# Patient Record
Sex: FEMALE | Race: WHITE | NOT HISPANIC OR LATINO | Employment: FULL TIME | URBAN - METROPOLITAN AREA
[De-identification: names, ages, dates, MRNs, and addresses within clinical notes are randomized per-mention and may not be internally consistent; named-entity substitution may affect disease eponyms.]

---

## 2017-05-04 ENCOUNTER — LAB REQUISITION (OUTPATIENT)
Dept: LAB | Facility: HOSPITAL | Age: 52
End: 2017-05-04

## 2017-05-04 DIAGNOSIS — L98.9 DISORDER OF SKIN OR SUBCUTANEOUS TISSUE: ICD-10-CM

## 2017-05-04 DIAGNOSIS — D48.5 NEOPLASM OF UNCERTAIN BEHAVIOR OF SKIN: ICD-10-CM

## 2017-05-04 PROCEDURE — 88305 TISSUE EXAM BY PATHOLOGIST: CPT | Performed by: PLASTIC SURGERY

## 2017-05-05 LAB
CYTO UR: NORMAL
LAB AP CASE REPORT: NORMAL
Lab: NORMAL
PATH REPORT.FINAL DX SPEC: NORMAL
PATH REPORT.GROSS SPEC: NORMAL

## 2021-06-24 RX ORDER — TEMAZEPAM 30 MG/1
CAPSULE ORAL
COMMUNITY
Start: 2013-02-19

## 2021-06-24 RX ORDER — MEDROXYPROGESTERONE ACETATE 2.5 MG/1
TABLET ORAL
COMMUNITY
Start: 2013-02-19

## 2021-06-24 RX ORDER — LORATADINE 10 MG/1
TABLET ORAL
COMMUNITY
Start: 2013-02-19

## 2021-07-06 ENCOUNTER — OFFICE VISIT (OUTPATIENT)
Dept: NEUROLOGY | Facility: CLINIC | Age: 56
End: 2021-07-06

## 2021-07-06 VITALS
HEART RATE: 78 BPM | DIASTOLIC BLOOD PRESSURE: 67 MMHG | WEIGHT: 135 LBS | TEMPERATURE: 97.8 F | HEIGHT: 64 IN | SYSTOLIC BLOOD PRESSURE: 103 MMHG | BODY MASS INDEX: 23.05 KG/M2

## 2021-07-06 DIAGNOSIS — G43.109 MIGRAINE WITH AURA AND WITHOUT STATUS MIGRAINOSUS, NOT INTRACTABLE: Primary | ICD-10-CM

## 2021-07-06 PROCEDURE — 99204 OFFICE O/P NEW MOD 45 MIN: CPT | Performed by: NURSE PRACTITIONER

## 2021-07-06 RX ORDER — MECLIZINE HYDROCHLORIDE 25 MG/1
TABLET ORAL
COMMUNITY

## 2021-07-06 RX ORDER — CHLORCYCLIZINE HYDROCHLORIDE AND PSEUDOEPHEDRINE HYDROCHLORIDE 25; 60 MG/1; MG/1
TABLET ORAL
COMMUNITY
Start: 2021-05-06

## 2021-07-06 RX ORDER — TOPIRAMATE 25 MG/1
TABLET ORAL
Qty: 30 TABLET | Refills: 2 | Status: SHIPPED | OUTPATIENT
Start: 2021-07-06 | End: 2021-10-13 | Stop reason: DRUGHIGH

## 2021-07-06 RX ORDER — ONDANSETRON HYDROCHLORIDE 8 MG/1
TABLET, FILM COATED ORAL
COMMUNITY
End: 2021-07-06 | Stop reason: SDUPTHER

## 2021-07-06 RX ORDER — CLONAZEPAM 0.5 MG/1
TABLET ORAL
COMMUNITY
Start: 2021-04-01

## 2021-07-06 RX ORDER — SUMATRIPTAN 50 MG/1
50 TABLET, FILM COATED ORAL DAILY
COMMUNITY
Start: 2021-04-29 | End: 2021-10-13

## 2021-07-06 RX ORDER — ONDANSETRON HYDROCHLORIDE 8 MG/1
8 TABLET, FILM COATED ORAL EVERY 12 HOURS PRN
Qty: 20 TABLET | Refills: 5 | Status: SHIPPED | OUTPATIENT
Start: 2021-07-06

## 2021-07-06 RX ORDER — TOPIRAMATE 25 MG/1
TABLET ORAL
Qty: 30 TABLET | Refills: 2 | Status: SHIPPED | OUTPATIENT
Start: 2021-07-06 | End: 2021-07-06 | Stop reason: SDUPTHER

## 2021-07-06 RX ORDER — ERGOCALCIFEROL 1.25 MG/1
50000 CAPSULE ORAL WEEKLY
COMMUNITY
Start: 2021-04-26

## 2021-07-06 NOTE — PROGRESS NOTES
Subjective:     Patient ID: Mary Ann Rebolledo is a 55 y.o. female.    CHIEF COMPLAINT: Change in chronic migraine    History of Present Illness Ms. Rebolledo is a very pleasant 55-year-old  female who was referred for chronic migraine with aura.  The patient reports over the past few months she has had a change in her migraines which constitutes auras which are severe consisting of losing part of her vision as well as flashing scotoma.  She reports she has had migraines since a teenager but this is a new impact from her migraines.  She has not had an MRI of her brain.  She had a trial on Trokendi but was unable to take it due to profound sleepiness and cognitive issues.  She has not had a trial on immediate release low-dose Topamax.  She reports she could not tolerate Trokendi on a daily basis.  She reports Imitrex 50 mg does stop the migraine pain, orders will continue.     The following portions of the patient's history were reviewed and updated as appropriate: allergies, current medications, past family history, past medical history, past social history, past surgical history and problem list.  With change in Migriane multiple Auras.  Multiple visual changes.      Complaint:Migraine  Onset: since teenager, worst since 5/2021  Aura: loses part of her vision and flashing scotoma - several a week   Location: Occiptal area back side of head on left side   Quality: intense pain  Severity: 7.5-8  Duration: 24 hrs   Frequency: 1 every 2-3 months   Timing:  random  Context: unknown  Modifying factors:   Associated Signs and symptoms:  intense headache, Photo sensitivity.  Current meds: none.. patient has tried trokendi in the past but it makes her groggy the next day      Past medical history significant for: Depression, migraine, hyperlipidemia, B12 and vitamin D deficiencies.          History reviewed. No pertinent family history.    Past Medical History:   Diagnosis Date   • Depressive disorder    • Headache    •  Hyperlipidemia    • Migraine    • Vitamin B12 deficiency    • Vitamin D deficiency        Social History     Socioeconomic History   • Marital status:      Spouse name: Not on file   • Number of children: Not on file   • Years of education: Not on file   • Highest education level: Not on file   Tobacco Use   • Smoking status: Current Every Day Smoker     Packs/day: 1.00     Types: Cigarettes         Current Outpatient Medications:   •  loratadine (Claritin) 10 MG tablet, CLARITIN 10 MG TABS, Disp: , Rfl:   •  medroxyPROGESTERone (PROVERA) 2.5 MG tablet, MEDROXYPROGESTERONE ACETATE 2.5 MG TABS, Disp: , Rfl:   •  ondansetron (ZOFRAN) 8 MG tablet, Take 1 tablet by mouth Every 12 (Twelve) Hours As Needed for Nausea or Vomiting., Disp: 20 tablet, Rfl: 5  •  temazepam (RESTORIL) 30 MG capsule, TEMAZEPAM 30 MG CAPS, Disp: , Rfl:   •  topiramate (Topamax) 25 MG tablet, 1 at bed time for 1 week then 1 twice a day for 1 week then 2 tabs twice a day, Disp: 30 tablet, Rfl: 2  •  clonazePAM (KlonoPIN) 0.5 MG tablet, TAKE 1/2 TABLET BY MOUTH EVERY DAY AS NEEDED, Disp: , Rfl:   •  diclofenac (VOLTAREN) 50 MG EC tablet, diclofenac sodium 50 mg tablet,delayed release  TK 1 T PO BID, Disp: , Rfl:   •  Diclofenac Sodium (VOLTAREN) 1 % gel gel, diclofenac 1 % topical gel  APPLY 2 GRAMS TO THE AFFECTED AREA(S) BY TOPICAL ROUTE 4 TIMES PER DAY, Disp: , Rfl:   •  meclizine (ANTIVERT) 25 MG tablet, meclizine 25 mg tablet  Take 1 tablet every 4-6 hours by oral route., Disp: , Rfl:   •  Stahist AD 25-60 MG tablet, TAKE 1 TABLET BY MOUTH EVERY 8 HOURS FOR 5 DAYS, Disp: , Rfl:   •  SUMAtriptan (IMITREX) 50 MG tablet, Take 50 mg by mouth Daily., Disp: , Rfl:   •  vitamin D (ERGOCALCIFEROL) 1.25 MG (24351 UT) capsule capsule, Take 50,000 Units by mouth 1 (One) Time Per Week., Disp: , Rfl:     Review of Systems   Constitutional: Negative for chills and fatigue.   HENT: Positive for sinus pressure. Negative for sinus pain.    Eyes:  Negative for photophobia and visual disturbance.   Respiratory: Negative for apnea.    Cardiovascular: Negative for palpitations.   Gastrointestinal: Negative for constipation.   Endocrine: Negative for cold intolerance and heat intolerance.   Genitourinary: Negative for frequency and urgency.   Musculoskeletal: Negative for neck pain and neck stiffness.   Neurological: Positive for dizziness and headaches.   Psychiatric/Behavioral: Negative for confusion and hallucinations.        I have reviewed ROS completed by medical assistant.     Objective:    Neurologic Exam     Mental Status   Oriented to person, place, and time.   Speech: speech is normal     Cranial Nerves     CN III, IV, VI   Pupils are equal, round, and reactive to light.    Gait, Coordination, and Reflexes     Gait  Gait: normal    Reflexes   Right brachioradialis: 2+  Left brachioradialis: 2+  Right biceps: 2+  Left biceps: 2+  Right triceps: 2+  Left triceps: 2+  Right patellar: 2+  Left patellar: 2+  Right achilles: 2+  Left achilles: 2+      Physical Exam  Vitals and nursing note reviewed.   Constitutional:       Appearance: Normal appearance. She is well-developed and well-groomed.   HENT:      Head: Normocephalic.      Right Ear: Tympanic membrane normal.      Nose: Nose normal.      Mouth/Throat:      Mouth: Mucous membranes are moist.   Eyes:      Pupils: Pupils are equal, round, and reactive to light.   Cardiovascular:      Rate and Rhythm: Normal rate and regular rhythm.      Pulses: Normal pulses.      Heart sounds: Normal heart sounds.   Pulmonary:      Effort: Pulmonary effort is normal.      Breath sounds: Normal breath sounds.   Musculoskeletal:         General: Normal range of motion.      Cervical back: Full passive range of motion without pain and normal range of motion.   Skin:     General: Skin is warm.   Neurological:      General: No focal deficit present.      Mental Status: She is alert and oriented to person, place, and time.       Cranial Nerves: Cranial nerves are intact.      Sensory: Sensation is intact.      Motor: Motor function is intact.      Coordination: Coordination is intact.      Gait: Gait is intact.      Deep Tendon Reflexes: Babinski sign absent on the right side. Babinski sign absent on the left side.      Reflex Scores:       Tricep reflexes are 2+ on the right side and 2+ on the left side.       Bicep reflexes are 2+ on the right side and 2+ on the left side.       Brachioradialis reflexes are 2+ on the right side and 2+ on the left side.       Patellar reflexes are 2+ on the right side and 2+ on the left side.       Achilles reflexes are 2+ on the right side and 2+ on the left side.  Psychiatric:         Attention and Perception: Attention normal.         Mood and Affect: Mood normal.         Speech: Speech normal.         Behavior: Behavior normal. Behavior is cooperative.         Thought Content: Thought content normal.         Cognition and Memory: Cognition normal.         Judgment: Judgment normal.         Assessment/Plan:    Diagnoses and all orders for this visit:    1. Migraine with aura and without status migrainosus, not intractable (Primary)  -     Discontinue: topiramate (Topamax) 25 MG tablet; I at bedtime for 1 week then 1 tab twice a day, then 2 po bid  Dispense: 30 tablet; Refill: 2  -     topiramate (Topamax) 25 MG tablet; 1 at bed time for 1 week then 1 twice a day for 1 week then 2 tabs twice a day  Dispense: 30 tablet; Refill: 2  -     ondansetron (ZOFRAN) 8 MG tablet; Take 1 tablet by mouth Every 12 (Twelve) Hours As Needed for Nausea or Vomiting.  Dispense: 20 tablet; Refill: 5  -     MRI Brain Without Contrast; Future        Return in about 3 months (around 10/6/2021) for Next scheduled follow up Latha Grover .    I spent 50 minutes caring for Mary Ann on this date of service. This time includes time spent by me in the following activities: obtaining and/or reviewing a separately obtained  history, performing a medically appropriate examination and/or evaluation, counseling and educating the patient/family/caregiver, ordering medications, tests, or procedures, referring and communicating with other health care professionals and documenting information in the medical record.      This document has been electronically signed by Latha DOUGHERTY on July 6, 2021 11:04 EDT

## 2021-07-28 ENCOUNTER — HOSPITAL ENCOUNTER (OUTPATIENT)
Dept: MRI IMAGING | Facility: HOSPITAL | Age: 56
Discharge: HOME | End: 2021-07-28
Admitting: NURSE PRACTITIONER

## 2021-07-28 DIAGNOSIS — G43.109 MIGRAINE WITH AURA AND WITHOUT STATUS MIGRAINOSUS, NOT INTRACTABLE: ICD-10-CM

## 2021-07-28 PROCEDURE — 70551 MRI BRAIN STEM W/O DYE: CPT

## 2021-08-02 ENCOUNTER — TELEPHONE (OUTPATIENT)
Dept: NEUROLOGY | Facility: CLINIC | Age: 56
End: 2021-08-02

## 2021-08-02 NOTE — TELEPHONE ENCOUNTER
----- Message from HAO Lyle sent at 8/2/2021  7:45 AM EDT -----  Please phone the patient and notify them that the results are normal.

## 2021-10-08 NOTE — PROGRESS NOTES
Subjective: migraine    Patient ID: Mary Ann Rebolledo is a 56 y.o. female.    CHIEF COMPLAINT:migraine    History of Present Illness Ms. Rebolledo is a very pleasant 56-year-old  female who is following up on chronic migraine with aura.  The patient reports over the past few months she has had a change in her migraines which constitutes auras which are severe consisting of losing part of her vision as well as flashing scotoma.  She reports she has had migraines since a teenager but this is a new impact from her migraines.  She was scheduled for an MRI of her brain.  She has had a trial on Trokendi but was unable to take it due to profound sleepiness and cognitive issues.  She states that the pharmacy was having difficulty and she did not receive the Topamax for preventative.  She feels her migraines are the same as they were at the last visit.  She reports she did have 1 week when they were severe including 1 waking migraine.She states that sumatriptan does stop the migraines.  She would like to restart low-dose Topamax.        MRI of the brain was discussed with the patient  DATE OF EXAM:   7/28/2021 4:50 PM   PROCEDURE:   MRI BRAIN WO CONTRAST-  IMPRESSION:  1 MRI the brain is normal.   Electronically Signed By-Woody Leon MD On:7/28/2021 7:21 PM  This report was finalized on [id number] by  Woody Leon MD.    The following portions of the patient's history were reviewed and updated as appropriate: allergies, current medications, past family history, past medical history, past social history, past surgical history and problem list.      History reviewed. No pertinent family history.    Past Medical History:   Diagnosis Date   • Depressive disorder    • Headache    • Hyperlipidemia    • Migraine    • Vitamin B12 deficiency    • Vitamin D deficiency        Social History     Socioeconomic History   • Marital status:    Tobacco Use   • Smoking status: Current Every Day Smoker      Packs/day: 1.00     Types: Cigarettes   • Smokeless tobacco: Never Used   Vaping Use   • Vaping Use: Never used   Substance and Sexual Activity   • Alcohol use: Defer   • Drug use: Defer   • Sexual activity: Defer         Current Outpatient Medications:   •  amoxicillin-clavulanate (AUGMENTIN) 875-125 MG per tablet, Take 1 tablet by mouth Every 12 (Twelve) Hours., Disp: , Rfl:   •  clonazePAM (KlonoPIN) 0.5 MG tablet, TAKE 1/2 TABLET BY MOUTH EVERY DAY AS NEEDED, Disp: , Rfl:   •  colestipol (COLESTID) 1 g tablet, Take 1 g by mouth 3 (Three) Times a Day., Disp: , Rfl:   •  diclofenac (VOLTAREN) 50 MG EC tablet, diclofenac sodium 50 mg tablet,delayed release  TK 1 T PO BID, Disp: , Rfl:   •  Diclofenac Sodium (VOLTAREN) 1 % gel gel, diclofenac 1 % topical gel  APPLY 2 GRAMS TO THE AFFECTED AREA(S) BY TOPICAL ROUTE 4 TIMES PER DAY, Disp: , Rfl:   •  fluticasone (FLONASE) 50 MCG/ACT nasal spray, , Disp: , Rfl:   •  loratadine (Claritin) 10 MG tablet, CLARITIN 10 MG TABS, Disp: , Rfl:   •  meclizine (ANTIVERT) 25 MG tablet, meclizine 25 mg tablet  Take 1 tablet every 4-6 hours by oral route., Disp: , Rfl:   •  medroxyPROGESTERone (PROVERA) 2.5 MG tablet, MEDROXYPROGESTERONE ACETATE 2.5 MG TABS, Disp: , Rfl:   •  omeprazole (priLOSEC) 20 MG capsule, Take 20 mg by mouth 2 (Two) Times a Day., Disp: , Rfl:   •  ondansetron (ZOFRAN) 8 MG tablet, Take 1 tablet by mouth Every 12 (Twelve) Hours As Needed for Nausea or Vomiting., Disp: 20 tablet, Rfl: 5  •  Stahist AD 25-60 MG tablet, TAKE 1 TABLET BY MOUTH EVERY 8 HOURS FOR 5 DAYS, Disp: , Rfl:   •  temazepam (RESTORIL) 30 MG capsule, TEMAZEPAM 30 MG CAPS, Disp: , Rfl:   •  vitamin D (ERGOCALCIFEROL) 1.25 MG (03646 UT) capsule capsule, Take 50,000 Units by mouth 1 (One) Time Per Week., Disp: , Rfl:   •  SUMAtriptan (Imitrex) 50 MG tablet, 1  tab at onset of Migraine. May rpt> 1 time in 2 hours. Max 2/24 or 3 days per week, Disp: 18 tablet, Rfl: 11  •  topiramate (Topamax) 25 MG  tablet, Take one at bed time for 1 month then 1 tab twice aday, Disp: 60 tablet, Rfl: 5    Review of Systems   Constitutional: Negative for fatigue and fever.   HENT: Negative for ear discharge and ear pain.    Eyes: Positive for pain. Negative for itching.   Respiratory: Negative for cough and shortness of breath.    Cardiovascular: Negative for chest pain.   Gastrointestinal: Negative for abdominal pain and nausea.   Endocrine: Negative for cold intolerance and heat intolerance.   Genitourinary: Negative for urgency.   Musculoskeletal: Negative for back pain and neck pain.   Neurological: Negative for dizziness and light-headedness.   Psychiatric/Behavioral: Negative for agitation and confusion.        I have reviewed ROS completed by medical assistant.     Objective:    Neurologic Exam     Mental Status   Oriented to person, place, and time.     Cranial Nerves     CN III, IV, VI   Pupils are equal, round, and reactive to light.      Physical Exam  Vitals and nursing note reviewed.   Constitutional:       Appearance: Normal appearance.   HENT:      Head: Normocephalic.      Nose: Nose normal.      Mouth/Throat:      Mouth: Mucous membranes are moist.   Eyes:      Extraocular Movements: Extraocular movements intact.      Pupils: Pupils are equal, round, and reactive to light.   Cardiovascular:      Rate and Rhythm: Normal rate and regular rhythm.      Pulses: Normal pulses.   Pulmonary:      Effort: Pulmonary effort is normal.   Musculoskeletal:         General: Normal range of motion.      Cervical back: Normal range of motion.   Neurological:      General: No focal deficit present.      Mental Status: She is alert and oriented to person, place, and time.      Cranial Nerves: No cranial nerve deficit.      Motor: No weakness.      Coordination: Coordination normal.      Gait: Gait normal.   Psychiatric:         Mood and Affect: Mood normal.         Thought Content: Thought content normal.         Judgment:  Judgment normal.       Discussion: The patient was told to contact the clinic if she had any difficulty getting her medications.  She was also told to call if she had any problems tolerating the Topamax.  She is to keep a migraine calendar.  And will be seen back in a follow-up in 3 months for reevaluation.    Assessment/Plan:    Diagnoses and all orders for this visit:    1. Migraine with aura and without status migrainosus, not intractable (Primary)  -     topiramate (Topamax) 25 MG tablet; Take one at bed time for 1 month then 1 tab twice aday  Dispense: 60 tablet; Refill: 5  -     SUMAtriptan (Imitrex) 50 MG tablet; 1  tab at onset of Migraine. May rpt> 1 time in 2 hours. Max 2/24 or 3 days per week  Dispense: 18 tablet; Refill: 11        Return in about 3 months (around 1/13/2022) for Latha Grover.    I spent25  minutes caring for Mary Ann on this date of service. This time includes time spent by me in the following activities: reviewing tests, obtaining and/or reviewing a separately obtained history, performing a medically appropriate examination and/or evaluation, counseling and educating the patient/family/caregiver, ordering medications, tests, or procedures, referring and communicating with other health care professionals and documenting information in the medical record.      This document has been electronically signed by Latha DOUGHERTY on October 13, 2021 17:02 EDT

## 2021-10-13 ENCOUNTER — OFFICE VISIT (OUTPATIENT)
Dept: NEUROLOGY | Facility: CLINIC | Age: 56
End: 2021-10-13

## 2021-10-13 VITALS
WEIGHT: 139 LBS | BODY MASS INDEX: 23.73 KG/M2 | DIASTOLIC BLOOD PRESSURE: 84 MMHG | HEART RATE: 88 BPM | HEIGHT: 64 IN | SYSTOLIC BLOOD PRESSURE: 158 MMHG | TEMPERATURE: 97.5 F

## 2021-10-13 DIAGNOSIS — G43.109 MIGRAINE WITH AURA AND WITHOUT STATUS MIGRAINOSUS, NOT INTRACTABLE: Primary | ICD-10-CM

## 2021-10-13 PROBLEM — F41.9 ANXIETY: Status: ACTIVE | Noted: 2018-03-16

## 2021-10-13 PROBLEM — F32.A DEPRESSION: Status: ACTIVE | Noted: 2018-03-16

## 2021-10-13 PROBLEM — E53.8 VITAMIN B12 DEFICIENCY (NON ANEMIC): Status: ACTIVE | Noted: 2020-09-30

## 2021-10-13 PROBLEM — E55.9 VITAMIN D DEFICIENCY: Status: ACTIVE | Noted: 2020-09-30

## 2021-10-13 PROBLEM — M17.9 OSTEOARTHRITIS OF KNEE: Status: ACTIVE | Noted: 2019-10-22

## 2021-10-13 PROBLEM — R51.9 HEADACHE: Status: ACTIVE | Noted: 2021-05-03

## 2021-10-13 PROBLEM — G47.00 PERSISTENT INSOMNIA: Status: ACTIVE | Noted: 2021-07-26

## 2021-10-13 PROBLEM — F31.9 BIPOLAR DISORDER (HCC): Status: ACTIVE | Noted: 2021-07-26

## 2021-10-13 PROBLEM — E78.5 HYPERLIPIDEMIA: Status: ACTIVE | Noted: 2021-01-29

## 2021-10-13 PROCEDURE — 99213 OFFICE O/P EST LOW 20 MIN: CPT | Performed by: NURSE PRACTITIONER

## 2021-10-13 RX ORDER — SUMATRIPTAN 50 MG/1
TABLET, FILM COATED ORAL
Qty: 18 TABLET | Refills: 11 | Status: SHIPPED | OUTPATIENT
Start: 2021-10-13

## 2021-10-13 RX ORDER — OMEPRAZOLE 20 MG/1
20 CAPSULE, DELAYED RELEASE ORAL 2 TIMES DAILY
COMMUNITY
Start: 2021-09-26

## 2021-10-13 RX ORDER — MONTELUKAST SODIUM 4 MG/1
1 TABLET, CHEWABLE ORAL 3 TIMES DAILY
COMMUNITY
Start: 2021-09-24

## 2021-10-13 RX ORDER — TOPIRAMATE 25 MG/1
TABLET ORAL
Qty: 60 TABLET | Refills: 5 | Status: SHIPPED | OUTPATIENT
Start: 2021-10-13 | End: 2021-10-14

## 2021-10-13 RX ORDER — AMOXICILLIN AND CLAVULANATE POTASSIUM 875; 125 MG/1; MG/1
1 TABLET, FILM COATED ORAL EVERY 12 HOURS
COMMUNITY
Start: 2021-08-17

## 2021-10-13 RX ORDER — FLUTICASONE PROPIONATE 50 MCG
SPRAY, SUSPENSION (ML) NASAL
COMMUNITY
Start: 2021-09-08

## 2021-10-14 DIAGNOSIS — G43.109 MIGRAINE WITH AURA AND WITHOUT STATUS MIGRAINOSUS, NOT INTRACTABLE: ICD-10-CM

## 2021-10-14 RX ORDER — TOPIRAMATE 25 MG/1
TABLET ORAL
Qty: 144 TABLET | Refills: 0 | Status: SHIPPED | OUTPATIENT
Start: 2021-10-14

## 2021-10-14 RX ORDER — TOPIRAMATE 25 MG/1
TABLET ORAL
Qty: 120 TABLET | Refills: 0 | Status: SHIPPED | OUTPATIENT
Start: 2021-10-14 | End: 2021-10-14